# Patient Record
Sex: MALE | Race: WHITE | NOT HISPANIC OR LATINO | Employment: STUDENT | URBAN - METROPOLITAN AREA
[De-identification: names, ages, dates, MRNs, and addresses within clinical notes are randomized per-mention and may not be internally consistent; named-entity substitution may affect disease eponyms.]

---
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
## 2024-01-24 ENCOUNTER — PATIENT MESSAGE (OUTPATIENT)
Dept: PEDIATRIC ENDOCRINOLOGY | Facility: CLINIC | Age: 19
End: 2024-01-24
Payer: COMMERCIAL

## 2024-01-24 NOTE — TELEPHONE ENCOUNTER
Spoke with pharmacist and she stated in order for patient to get another refill he would have to pay out of pocket. Patient just picked up prescription yesterday and insurance will not pay for an early refill. A 4 vial supply is $134.35 and a 2 vial supply is $67.18. Advised patient.

## 2024-02-27 DIAGNOSIS — F64.0 GENDER DYSPHORIA IN ADULT: ICD-10-CM

## 2024-02-27 DIAGNOSIS — Z78.9 FEMALE-TO-MALE TRANSGENDER PERSON: ICD-10-CM

## 2024-02-28 ENCOUNTER — PATIENT MESSAGE (OUTPATIENT)
Dept: PEDIATRIC ENDOCRINOLOGY | Facility: CLINIC | Age: 19
End: 2024-02-28
Payer: COMMERCIAL

## 2024-02-28 RX ORDER — TESTOSTERONE CYPIONATE 200 MG/ML
100 INJECTION, SOLUTION INTRAMUSCULAR
Qty: 4 ML | Refills: 5 | OUTPATIENT
Start: 2024-02-28

## 2024-02-29 ENCOUNTER — PATIENT MESSAGE (OUTPATIENT)
Dept: PEDIATRIC ENDOCRINOLOGY | Facility: CLINIC | Age: 19
End: 2024-02-29
Payer: COMMERCIAL

## 2024-02-29 DIAGNOSIS — Z78.9 FEMALE-TO-MALE TRANSGENDER PERSON: ICD-10-CM

## 2024-02-29 DIAGNOSIS — F64.0 GENDER DYSPHORIA IN ADULT: ICD-10-CM

## 2024-02-29 RX ORDER — TESTOSTERONE CYPIONATE 200 MG/ML
100 INJECTION, SOLUTION INTRAMUSCULAR
Qty: 4 ML | Refills: 1 | Status: SHIPPED | OUTPATIENT
Start: 2024-02-29 | End: 2024-04-24 | Stop reason: SDUPTHER

## 2024-04-04 ENCOUNTER — TELEPHONE (OUTPATIENT)
Dept: ENDOCRINOLOGY | Facility: CLINIC | Age: 19
End: 2024-04-04
Payer: COMMERCIAL

## 2024-04-04 NOTE — TELEPHONE ENCOUNTER
----- Message from Vishal Edwards sent at 4/4/2024 10:43 AM CDT -----  Regarding: Medication Appt  Contact: CHIDI AVINA [[id number]]  Name of Who is Calling: Alphonso Buckner (mom)       What is the request in detail: Would like to speak with staff in regards to an appt ASAP. States pt is needing a new prescription for testosterone cypionate (DEPOTESTOTERONE CYPIONATE) 200 mg/mL injection. Dr. Warren will no longer provide prescriptions. Please assist with scheduling       Can the clinic reply by [facility]: no       What Number to Call Back if not in [facility]: [phone]

## 2024-04-12 ENCOUNTER — TELEPHONE (OUTPATIENT)
Dept: ENDOCRINOLOGY | Facility: CLINIC | Age: 19
End: 2024-04-12
Payer: COMMERCIAL

## 2024-04-12 NOTE — TELEPHONE ENCOUNTER
----- Message from Kenneth Tobar sent at 4/12/2024  9:18 AM CDT -----  Regarding: Appt  Contact: [phone]  God morning the pt mom  Alphonso Buckner would like to speak with staff in regards to an appt ASAP. States pt is needing a new prescription for testosterone cypionate (DEPOTESTOTERONE CYPIONATE) 200 mg/mL injection. She has returned a missed from [facility] made on 4/4 but still has not received a response.Dr. Warren will no longer provide prescriptions. Please assist with scheduling

## 2024-04-17 ENCOUNTER — TELEPHONE (OUTPATIENT)
Dept: ENDOCRINOLOGY | Facility: CLINIC | Age: 19
End: 2024-04-17
Payer: COMMERCIAL

## 2024-04-17 NOTE — TELEPHONE ENCOUNTER
----- Message from Kenneth Tobar sent at 4/17/2024  8:12 AM CDT -----  Regarding: Sooner appt / medication refill  Contact: [phone]  Good morning the pt mom Alphonso is following on call regarding getting a refill or sooner appt to discuss the testosterone cypionate (DEPOTESTOTERONE CYPIONATE) 200 mg/mL injection being refilled since the pt will be out of medication on next week. Pt mom spoke to a nurse who was suppose to follow up with her on Monday. No call was received.Please contact pt mom to further discuss.

## 2024-04-24 ENCOUNTER — OFFICE VISIT (OUTPATIENT)
Dept: ENDOCRINOLOGY | Facility: CLINIC | Age: 19
End: 2024-04-24
Payer: COMMERCIAL

## 2024-04-24 VITALS
SYSTOLIC BLOOD PRESSURE: 120 MMHG | BODY MASS INDEX: 21.36 KG/M2 | WEIGHT: 144.19 LBS | DIASTOLIC BLOOD PRESSURE: 85 MMHG | HEIGHT: 69 IN

## 2024-04-24 DIAGNOSIS — Z79.899 TRANSGENDER MAN ON HORMONE THERAPY: Primary | ICD-10-CM

## 2024-04-24 DIAGNOSIS — F64.0 TRANSGENDER MAN ON HORMONE THERAPY: Primary | ICD-10-CM

## 2024-04-24 DIAGNOSIS — Z78.9 FEMALE-TO-MALE TRANSGENDER PERSON: ICD-10-CM

## 2024-04-24 DIAGNOSIS — F64.0 GENDER DYSPHORIA IN ADULT: ICD-10-CM

## 2024-04-24 PROCEDURE — 99999 PR PBB SHADOW E&M-EST. PATIENT-LVL IV: CPT | Mod: PBBFAC,,, | Performed by: INTERNAL MEDICINE

## 2024-04-24 PROCEDURE — 99204 OFFICE O/P NEW MOD 45 MIN: CPT | Mod: S$GLB,,, | Performed by: INTERNAL MEDICINE

## 2024-04-24 PROCEDURE — G2211 COMPLEX E/M VISIT ADD ON: HCPCS | Mod: S$GLB,,, | Performed by: INTERNAL MEDICINE

## 2024-04-24 RX ORDER — TESTOSTERONE CYPIONATE 200 MG/ML
100 INJECTION, SOLUTION INTRAMUSCULAR
Qty: 4 ML | Refills: 5 | Status: SHIPPED | OUTPATIENT
Start: 2024-04-24 | End: 2024-05-23 | Stop reason: SDUPTHER

## 2024-04-24 NOTE — PROGRESS NOTES
"Subjective:      Patient ID: Benigno Petit is a 18 y.o.    Chief Complaint:  gender  - last in clinic visit 04/24/2024    History of Present Illness  New to me  Was seen in pediatric endocrinology in the past.    With regards to the gender incongruence care:    Gender identity - male      Pronouns:  he/him       Name legally changed?  Yes   Gender marker legally changed? Yes      Therapist at the time hormones were started:  Yes    Gender Surgeries - none, not interested  -- because Lupron was started so early, did not have a 1st period or   develop  significant breast tissue         Fertility concerns - not  interested    Initially was put on Lupron which was discontinued in December of 2019.  He is currently on testosterone 100 mg weekly.  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      Physical Exam  Vitals reviewed.   Constitutional:       Appearance: Normal appearance.   Neck:      Comments: No goiter   Cardiovascular:      Rate and Rhythm: Normal rate.   Pulmonary:      Effort: Pulmonary effort is normal.   Abdominal:      Palpations: Abdomen is soft.   Musculoskeletal:      Right lower leg: No edema.      Left lower leg: No edema.   Psychiatric:         Mood and Affect: Mood normal.     No breast tissue         Lab Review:   No results found for: "HGBA1C"  Lab Results   Component Value Date    CHOL 135 10/11/2023    HDL 53 " "10/11/2023    LDLCALC 65.4 10/11/2023    TRIG 83 10/11/2023    CHOLHDL 39.3 10/11/2023     Lab Results   Component Value Date     10/11/2023    K 3.7 10/11/2023     10/11/2023    CO2 22 (L) 10/11/2023     (H) 10/11/2023    BUN 13 10/11/2023    CREATININE 0.9 10/11/2023    CALCIUM 9.3 10/11/2023    PROT 7.7 10/11/2023    ALBUMIN 4.6 10/11/2023    BILITOT 2.0 (H) 10/11/2023    ALKPHOS 63 10/11/2023    AST 17 10/11/2023    ALT 23 10/11/2023    ANIONGAP 11 10/11/2023    ESTGFRAFRICA SEE COMMENT 05/16/2022    EGFRNONAA SEE COMMENT 05/16/2022    TSH 1.662 01/07/2023     No results found for: "[id number]"  Lab Results   Component Value Date    WBC 12.10 10/11/2023    HGB 15.8 10/11/2023    HCT 47.3 10/11/2023    MCV 89 10/11/2023     10/11/2023           Assessment and Plan     Transgender man on hormone therapy  Doing great.  Continue regimen.  He is interested in pellets so will put in a referral to Urology to see if he qualifies.  Labs today and if okay return to clinic in 1 year          "

## 2024-04-24 NOTE — ASSESSMENT & PLAN NOTE
Doing great.  Continue regimen.  He is interested in pellets so will put in a referral to Urology to see if he qualifies.  Labs today and if okay return to clinic in 1 year

## 2024-04-25 ENCOUNTER — TELEPHONE (OUTPATIENT)
Dept: UROLOGY | Facility: CLINIC | Age: 19
End: 2024-04-25
Payer: COMMERCIAL

## 2024-04-25 NOTE — TELEPHONE ENCOUNTER
----- Message from Obinna Pederson MD sent at 4/25/2024  3:54 PM CDT -----  Regarding: RE: testosterone pellets  Svitlana Guy, can you please arrange for this patient to be seen in clinic?    Thanks,  Obinna Pederson  ----- Message -----  From: Hanna Ford LPN  Sent: 4/25/2024   3:40 PM CDT  To: Obinna Pederson MD  Subject: testosterone pellets                             Branden Abdalla, Hope you and your family are well!! ,This patient is transgender male and is on testosteron his last testosterone level was Most Recent Testosterone, Total: 919 10/11/23 16:33. Patient wants to get off of injections and start with pellets. Will you see this patient to discuss the pellets ? Thanks Nurse Logan.

## 2024-04-30 ENCOUNTER — LAB VISIT (OUTPATIENT)
Dept: LAB | Facility: HOSPITAL | Age: 19
End: 2024-04-30
Attending: INTERNAL MEDICINE
Payer: COMMERCIAL

## 2024-04-30 DIAGNOSIS — Z79.899 TRANSGENDER MAN ON HORMONE THERAPY: ICD-10-CM

## 2024-04-30 DIAGNOSIS — F64.0 TRANSGENDER MAN ON HORMONE THERAPY: ICD-10-CM

## 2024-04-30 LAB
ALBUMIN SERPL BCP-MCNC: 4.6 G/DL (ref 3.5–5.2)
ALP SERPL-CCNC: 64 U/L (ref 55–135)
ALT SERPL W/O P-5'-P-CCNC: 12 U/L (ref 10–44)
ANION GAP SERPL CALC-SCNC: 12 MMOL/L (ref 8–16)
AST SERPL-CCNC: 15 U/L (ref 10–40)
BILIRUB SERPL-MCNC: 2.8 MG/DL (ref 0.1–1)
BUN SERPL-MCNC: 11 MG/DL (ref 6–20)
CALCIUM SERPL-MCNC: 9.7 MG/DL (ref 8.7–10.5)
CHLORIDE SERPL-SCNC: 106 MMOL/L (ref 95–110)
CO2 SERPL-SCNC: 22 MMOL/L (ref 23–29)
CREAT SERPL-MCNC: 1 MG/DL (ref 0.5–1.4)
ERYTHROCYTE [DISTWIDTH] IN BLOOD BY AUTOMATED COUNT: 12 % (ref 11.5–14.5)
EST. GFR  (NO RACE VARIABLE): >60 ML/MIN/1.73 M^2
GLUCOSE SERPL-MCNC: 93 MG/DL (ref 70–110)
HCT VFR BLD AUTO: 47.8 % (ref 40–54)
HGB BLD-MCNC: 16.4 G/DL (ref 14–18)
MCH RBC QN AUTO: 29.7 PG (ref 27–31)
MCHC RBC AUTO-ENTMCNC: 34.3 G/DL (ref 32–36)
MCV RBC AUTO: 87 FL (ref 82–98)
PLATELET # BLD AUTO: 263 K/UL (ref 150–450)
PMV BLD AUTO: 10.8 FL (ref 9.2–12.9)
POTASSIUM SERPL-SCNC: 3.8 MMOL/L (ref 3.5–5.1)
PROT SERPL-MCNC: 8.1 G/DL (ref 6–8.4)
RBC # BLD AUTO: 5.52 M/UL (ref 4.6–6.2)
SODIUM SERPL-SCNC: 140 MMOL/L (ref 136–145)
WBC # BLD AUTO: 10.93 K/UL (ref 3.9–12.7)

## 2024-04-30 PROCEDURE — 85027 COMPLETE CBC AUTOMATED: CPT | Performed by: INTERNAL MEDICINE

## 2024-04-30 PROCEDURE — 36415 COLL VENOUS BLD VENIPUNCTURE: CPT | Mod: PO | Performed by: INTERNAL MEDICINE

## 2024-04-30 PROCEDURE — 80053 COMPREHEN METABOLIC PANEL: CPT | Performed by: INTERNAL MEDICINE

## 2024-05-01 ENCOUNTER — PATIENT MESSAGE (OUTPATIENT)
Dept: ENDOCRINOLOGY | Facility: CLINIC | Age: 19
End: 2024-05-01
Payer: COMMERCIAL

## 2024-05-22 ENCOUNTER — PATIENT MESSAGE (OUTPATIENT)
Dept: ENDOCRINOLOGY | Facility: CLINIC | Age: 19
End: 2024-05-22
Payer: COMMERCIAL

## 2024-05-22 DIAGNOSIS — F64.0 TRANSGENDER MAN ON HORMONE THERAPY: ICD-10-CM

## 2024-05-22 DIAGNOSIS — Z79.899 TRANSGENDER MAN ON HORMONE THERAPY: ICD-10-CM

## 2024-05-23 RX ORDER — TESTOSTERONE CYPIONATE 200 MG/ML
100 INJECTION, SOLUTION INTRAMUSCULAR
Qty: 4 ML | Refills: 5 | Status: SHIPPED | OUTPATIENT
Start: 2024-05-23

## 2024-07-11 ENCOUNTER — PATIENT MESSAGE (OUTPATIENT)
Dept: ENDOCRINOLOGY | Facility: CLINIC | Age: 19
End: 2024-07-11
Payer: COMMERCIAL

## 2024-07-11 DIAGNOSIS — F64.0 TRANSGENDER MAN ON HORMONE THERAPY: ICD-10-CM

## 2024-07-11 DIAGNOSIS — Z79.899 TRANSGENDER MAN ON HORMONE THERAPY: ICD-10-CM

## 2024-07-15 RX ORDER — TESTOSTERONE CYPIONATE 200 MG/ML
100 INJECTION, SOLUTION INTRAMUSCULAR
Qty: 4 ML | Refills: 5 | Status: SHIPPED | OUTPATIENT
Start: 2024-07-15